# Patient Record
Sex: FEMALE | Race: WHITE | Employment: STUDENT | URBAN - METROPOLITAN AREA
[De-identification: names, ages, dates, MRNs, and addresses within clinical notes are randomized per-mention and may not be internally consistent; named-entity substitution may affect disease eponyms.]

---
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
## 2023-02-27 ENCOUNTER — APPOINTMENT (OUTPATIENT)
Dept: URBAN - METROPOLITAN AREA SURGERY 11 | Age: 18
Setting detail: DERMATOLOGY
End: 2023-02-27

## 2023-03-27 ENCOUNTER — APPOINTMENT (OUTPATIENT)
Dept: URBAN - METROPOLITAN AREA SURGERY 12 | Age: 18
Setting detail: DERMATOLOGY
End: 2023-03-27

## 2023-03-27 DIAGNOSIS — B07.8 OTHER VIRAL WARTS: ICD-10-CM

## 2023-03-27 PROCEDURE — OTHER BENIGN DESTRUCTION: OTHER

## 2023-03-27 PROCEDURE — OTHER ADDITIONAL NOTES: OTHER

## 2023-03-27 PROCEDURE — OTHER COUNSELING: OTHER

## 2023-03-27 PROCEDURE — 17110 DESTRUCT B9 LESION 1-14: CPT

## 2023-03-27 ASSESSMENT — LOCATION DETAILED DESCRIPTION DERM: LOCATION DETAILED: RIGHT MID RADIAL DORSAL RING FINGER

## 2023-03-27 ASSESSMENT — LOCATION SIMPLE DESCRIPTION DERM: LOCATION SIMPLE: RIGHT RING FINGER

## 2023-03-27 ASSESSMENT — LOCATION ZONE DERM: LOCATION ZONE: FINGER

## 2023-03-27 NOTE — PROCEDURE: COUNSELING
Topical Salicylic Acid Recommendations: Cont topical otc preparation qhs and RTO to repeat cryo to remaining lesions within 3-4 weeks.
Detail Level: Detailed

## 2023-03-27 NOTE — PROCEDURE: ADDITIONAL NOTES
Detail Level: Simple
Render Risk Assessment In Note?: no
Additional Notes: Spot Size: 7mm\\nFluence: 12 j/cm\\nNumber of Pulses: 2\\nPulse width: 1.5ms

## 2023-03-27 NOTE — PROCEDURE: BENIGN DESTRUCTION
Medical Necessity Information: It is in your best interest to select a reason for this procedure from the list below. All of these items fulfill various CMS LCD requirements except the new and changing color options.
Treatment Number (Will Not Render If 0): 0
Post-Care Instructions: I reviewed with the patient in detail post-care instructions. Patient is to wear sunprotection, and avoid picking at any of the treated lesions. Pt may apply Vaseline to crusted or scabbing areas.
Render Post-Care Instructions In Note?: no
Detail Level: Detailed
Medical Necessity Clause: This procedure was medically necessary because the lesions that were treated were:
Consent: The patient's consent was obtained including but not limited to risks of crusting, scabbing, blistering, scarring, darker or lighter pigmentary change, recurrence, incomplete removal and infection.

## 2024-06-20 ENCOUNTER — OFFICE (OUTPATIENT)
Dept: URBAN - METROPOLITAN AREA CLINIC 72 | Facility: CLINIC | Age: 19
End: 2024-06-20

## 2024-06-20 VITALS
RESPIRATION RATE: 14 BRPM | SYSTOLIC BLOOD PRESSURE: 116 MMHG | DIASTOLIC BLOOD PRESSURE: 64 MMHG | HEIGHT: 67 IN | HEART RATE: 108 BPM | WEIGHT: 165 LBS

## 2024-06-20 DIAGNOSIS — R74.8 ABNORMAL LEVELS OF OTHER SERUM ENZYMES: ICD-10-CM

## 2024-06-20 DIAGNOSIS — R10.10 UPPER ABDOMINAL PAIN, UNSPECIFIED: ICD-10-CM

## 2024-06-20 DIAGNOSIS — R10.13 EPIGASTRIC PAIN: ICD-10-CM

## 2024-06-20 DIAGNOSIS — G90.A POSTURAL ORTHOSTATIC TACHYCARDIA SYNDROME [POTS]: ICD-10-CM

## 2024-06-20 DIAGNOSIS — R11.2 NAUSEA WITH VOMITING, UNSPECIFIED: ICD-10-CM

## 2024-06-20 DIAGNOSIS — R13.10 DYSPHAGIA, UNSPECIFIED: ICD-10-CM

## 2024-06-20 DIAGNOSIS — R19.4 CHANGE IN BOWEL HABIT: ICD-10-CM

## 2024-06-20 DIAGNOSIS — R12 HEARTBURN: ICD-10-CM

## 2024-06-20 PROCEDURE — 99204 OFFICE O/P NEW MOD 45 MIN: CPT | Performed by: NURSE PRACTITIONER

## 2024-06-20 RX ORDER — PANTOPRAZOLE SODIUM 40 MG/1
40 TABLET, DELAYED RELEASE ORAL
Qty: 90 | Refills: 0 | Status: ACTIVE
Start: 2024-06-20

## 2024-06-24 LAB
ALLERGEN PROFILE, FOOD-BASIC: CLASS DESCRIPTION: (no result)
ALLERGEN PROFILE, FOOD-BASIC: F001-IGE EGG WHITE: <0.1 KU/L
ALLERGEN PROFILE, FOOD-BASIC: F002-IGE MILK: <0.1 KU/L
ALLERGEN PROFILE, FOOD-BASIC: F003-IGE CODFISH: <0.1 KU/L
ALLERGEN PROFILE, FOOD-BASIC: F004-IGE WHEAT: <0.1 KU/L
ALLERGEN PROFILE, FOOD-BASIC: F013-IGE PEANUT: <0.1 KU/L
ALLERGEN PROFILE, FOOD-BASIC: F014-IGE SOYBEAN: <0.1 KU/L
CBC WITH DIFFERENTIAL/PLATELET: BASO (ABSOLUTE): 0 X10E3/UL (ref 0–0.2)
CBC WITH DIFFERENTIAL/PLATELET: BASOS: 0 %
CBC WITH DIFFERENTIAL/PLATELET: EOS (ABSOLUTE): 0.2 X10E3/UL (ref 0–0.4)
CBC WITH DIFFERENTIAL/PLATELET: EOS: 2 %
CBC WITH DIFFERENTIAL/PLATELET: HEMATOCRIT: 42.2 % (ref 34–46.6)
CBC WITH DIFFERENTIAL/PLATELET: HEMATOLOGY COMMENTS: (no result)
CBC WITH DIFFERENTIAL/PLATELET: HEMOGLOBIN: 13.9 G/DL (ref 11.1–15.9)
CBC WITH DIFFERENTIAL/PLATELET: IMMATURE CELLS: (no result)
CBC WITH DIFFERENTIAL/PLATELET: IMMATURE GRANS (ABS): 0 X10E3/UL (ref 0–0.1)
CBC WITH DIFFERENTIAL/PLATELET: IMMATURE GRANULOCYTES: 1 %
CBC WITH DIFFERENTIAL/PLATELET: LYMPHS (ABSOLUTE): 2 X10E3/UL (ref 0.7–3.1)
CBC WITH DIFFERENTIAL/PLATELET: LYMPHS: 25 %
CBC WITH DIFFERENTIAL/PLATELET: MCH: 29.6 PG (ref 26.6–33)
CBC WITH DIFFERENTIAL/PLATELET: MCHC: 32.9 G/DL (ref 31.5–35.7)
CBC WITH DIFFERENTIAL/PLATELET: MCV: 90 FL (ref 79–97)
CBC WITH DIFFERENTIAL/PLATELET: MONOCYTES(ABSOLUTE): 0.5 X10E3/UL (ref 0.1–0.9)
CBC WITH DIFFERENTIAL/PLATELET: MONOCYTES: 6 %
CBC WITH DIFFERENTIAL/PLATELET: NEUTROPHILS (ABSOLUTE): 5.3 X10E3/UL (ref 1.4–7)
CBC WITH DIFFERENTIAL/PLATELET: NEUTROPHILS: 66 %
CBC WITH DIFFERENTIAL/PLATELET: NRBC: (no result)
CBC WITH DIFFERENTIAL/PLATELET: PLATELETS: 286 X10E3/UL (ref 150–450)
CBC WITH DIFFERENTIAL/PLATELET: RBC: 4.7 X10E6/UL (ref 3.77–5.28)
CBC WITH DIFFERENTIAL/PLATELET: RDW: 12.7 % (ref 11.7–15.4)
CBC WITH DIFFERENTIAL/PLATELET: WBC: 8.1 X10E3/UL (ref 3.4–10.8)
COMP. METABOLIC PANEL (14): ALBUMIN: 4.5 G/DL (ref 4–5)
COMP. METABOLIC PANEL (14): ALKALINE PHOSPHATASE: 94 IU/L (ref 42–106)
COMP. METABOLIC PANEL (14): ALT (SGPT): 62 IU/L — HIGH (ref 0–32)
COMP. METABOLIC PANEL (14): AST (SGOT): 55 IU/L — HIGH (ref 0–40)
COMP. METABOLIC PANEL (14): BILIRUBIN, TOTAL: 0.5 MG/DL (ref 0–1.2)
COMP. METABOLIC PANEL (14): BUN/CREATININE RATIO: 13 (ref 9–23)
COMP. METABOLIC PANEL (14): BUN: 9 MG/DL (ref 6–20)
COMP. METABOLIC PANEL (14): CALCIUM: 9.8 MG/DL (ref 8.7–10.2)
COMP. METABOLIC PANEL (14): CARBON DIOXIDE, TOTAL: 20 MMOL/L (ref 20–29)
COMP. METABOLIC PANEL (14): CHLORIDE: 104 MMOL/L (ref 96–106)
COMP. METABOLIC PANEL (14): CREATININE: 0.67 MG/DL (ref 0.57–1)
COMP. METABOLIC PANEL (14): EGFR: 128 ML/MIN/1.73 (ref 59–?)
COMP. METABOLIC PANEL (14): GLOBULIN, TOTAL: 2.6 G/DL (ref 1.5–4.5)
COMP. METABOLIC PANEL (14): GLUCOSE: 107 MG/DL — HIGH (ref 70–99)
COMP. METABOLIC PANEL (14): POTASSIUM: 4.2 MMOL/L (ref 3.5–5.2)
COMP. METABOLIC PANEL (14): PROTEIN, TOTAL: 7.1 G/DL (ref 6–8.5)
COMP. METABOLIC PANEL (14): SODIUM: 139 MMOL/L (ref 134–144)
T-TRANSGLUTAMINASE (TTG) IGA: <2 U/ML

## 2024-06-25 ENCOUNTER — OFFICE (OUTPATIENT)
Dept: URBAN - METROPOLITAN AREA PATHOLOGY 10 | Facility: PATHOLOGY | Age: 19
End: 2024-06-25
Payer: COMMERCIAL

## 2024-06-25 ENCOUNTER — AMBULATORY SURGICAL CENTER (OUTPATIENT)
Dept: URBAN - METROPOLITAN AREA SURGERY 19 | Facility: SURGERY | Age: 19
End: 2024-06-25

## 2024-06-25 DIAGNOSIS — K29.50 UNSPECIFIED CHRONIC GASTRITIS WITHOUT BLEEDING: ICD-10-CM

## 2024-06-25 DIAGNOSIS — R13.14 DYSPHAGIA, PHARYNGOESOPHAGEAL PHASE: ICD-10-CM

## 2024-06-25 LAB
RELEVANT H&P ENDOSCOPY: (no result)
RELEVANT H&P ENDOSCOPY: (no result)

## 2024-06-25 PROCEDURE — 88305 TISSUE EXAM BY PATHOLOGIST: CPT | Performed by: STUDENT IN AN ORGANIZED HEALTH CARE EDUCATION/TRAINING PROGRAM

## 2024-06-25 PROCEDURE — 43239 EGD BIOPSY SINGLE/MULTIPLE: CPT | Performed by: INTERNAL MEDICINE

## 2024-06-25 PROCEDURE — 88313 SPECIAL STAINS GROUP 2: CPT | Performed by: STUDENT IN AN ORGANIZED HEALTH CARE EDUCATION/TRAINING PROGRAM

## 2024-06-25 PROCEDURE — 88342 IMHCHEM/IMCYTCHM 1ST ANTB: CPT | Performed by: STUDENT IN AN ORGANIZED HEALTH CARE EDUCATION/TRAINING PROGRAM

## 2024-07-18 ENCOUNTER — OFFICE (OUTPATIENT)
Dept: URBAN - METROPOLITAN AREA CLINIC 72 | Facility: CLINIC | Age: 19
End: 2024-07-18

## 2024-07-18 VITALS
DIASTOLIC BLOOD PRESSURE: 84 MMHG | SYSTOLIC BLOOD PRESSURE: 122 MMHG | HEART RATE: 84 BPM | HEIGHT: 67 IN | WEIGHT: 170 LBS

## 2024-07-18 DIAGNOSIS — K76.0 FATTY (CHANGE OF) LIVER, NOT ELSEWHERE CLASSIFIED: ICD-10-CM

## 2024-07-18 DIAGNOSIS — R15.2 FECAL URGENCY: ICD-10-CM

## 2024-07-18 DIAGNOSIS — R19.7 DIARRHEA, UNSPECIFIED: ICD-10-CM

## 2024-07-18 DIAGNOSIS — R12 HEARTBURN: ICD-10-CM

## 2024-07-18 DIAGNOSIS — E74.31 SUCRASE-ISOMALTASE DEFICIENCY: ICD-10-CM

## 2024-07-18 DIAGNOSIS — R74.8 ABNORMAL LEVELS OF OTHER SERUM ENZYMES: ICD-10-CM

## 2024-07-18 PROCEDURE — 99214 OFFICE O/P EST MOD 30 MIN: CPT | Performed by: NURSE PRACTITIONER

## 2024-07-18 RX ORDER — DICYCLOMINE HYDROCHLORIDE 10 MG/1
30 CAPSULE ORAL
Qty: 90 | Refills: 0 | Status: ACTIVE
Start: 2024-07-18

## 2025-02-07 ENCOUNTER — APPOINTMENT (OUTPATIENT)
Dept: URBAN - METROPOLITAN AREA SURGERY 12 | Age: 20
Setting detail: DERMATOLOGY
End: 2025-02-08

## 2025-02-07 DIAGNOSIS — B07.8 OTHER VIRAL WARTS: ICD-10-CM

## 2025-02-07 DIAGNOSIS — L30.9 DERMATITIS, UNSPECIFIED: ICD-10-CM

## 2025-02-07 PROCEDURE — OTHER COUNSELING: OTHER

## 2025-02-07 PROCEDURE — 99213 OFFICE O/P EST LOW 20 MIN: CPT | Mod: 25

## 2025-02-07 PROCEDURE — OTHER ADDITIONAL NOTES: OTHER

## 2025-02-07 PROCEDURE — 17110 DESTRUCT B9 LESION 1-14: CPT

## 2025-02-07 PROCEDURE — OTHER PRESCRIPTION: OTHER

## 2025-02-07 PROCEDURE — OTHER LIQUID NITROGEN: OTHER

## 2025-02-07 RX ORDER — TACROLIMUS 1 MG/G
OINTMENT TOPICAL
Qty: 60 | Refills: 2 | Status: ERX | COMMUNITY
Start: 2025-02-07

## 2025-02-07 ASSESSMENT — LOCATION ZONE DERM
LOCATION ZONE: FINGER
LOCATION ZONE: NOSE
LOCATION ZONE: LIP

## 2025-02-07 ASSESSMENT — LOCATION DETAILED DESCRIPTION DERM
LOCATION DETAILED: RIGHT NARIS
LOCATION DETAILED: RIGHT DISTAL RADIAL DORSAL RING FINGER
LOCATION DETAILED: LEFT UPPER CUTANEOUS LIP
LOCATION DETAILED: LEFT NARIS

## 2025-02-07 ASSESSMENT — LOCATION SIMPLE DESCRIPTION DERM
LOCATION SIMPLE: LEFT NOSE
LOCATION SIMPLE: LEFT LIP
LOCATION SIMPLE: RIGHT NOSE
LOCATION SIMPLE: RIGHT RING FINGER

## 2025-02-07 NOTE — HPI: OTHER
Condition:: Pt present for irritation on nose and wart.
Please Describe Your Condition:: Pt present for irritation on nose since being sick before [place] and hadn’t went away with moisturizer. Pt has warts that have been txtd in past, but are back on hands.

## 2025-02-07 NOTE — PROCEDURE: LIQUID NITROGEN
Render Note In Bullet Format When Appropriate: No
Show Aperture Variable?: Yes
Medical Necessity Clause: This procedure was medically necessary because the lesions that were treated were:
Post-Care Instructions: I reviewed with the patient in detail post-care instructions. Patient is to wear sunprotection, and avoid picking at any of the treated lesions. Pt may apply Vaseline to crusted or scabbing areas.
Detail Level: Detailed
Medical Necessity Information: It is in your best interest to select a reason for this procedure from the list below. All of these items fulfill various CMS LCD requirements except the new and changing color options.
Consent: The patient's consent was obtained including but not limited to risks of crusting, scabbing, blistering, scarring, darker or lighter pigmentary change, recurrence, incomplete removal and infection.
Spray Paint Text: The liquid nitrogen was applied to the skin utilizing a spray paint frosting technique.
Number Of Freeze-Thaw Cycles: 2 freeze-thaw cycles

## 2025-02-07 NOTE — PROCEDURE: ADDITIONAL NOTES
Render Risk Assessment In Note?: no
Additional Notes: May use cicaplast or aquaphor prn\\n\\nShe usually uses Flonase but hasn’t used lately, caution with this around this region. She had been blowing nose frequently  before. Not noted around mouth
Detail Level: Simple

## (undated) NOTE — MR AVS SNAPSHOT
[address] [facility]  [phone]               Thank you for choosing us for your health care visit with COCO Stephens.   We are glad to serve you and happy to provide you with this summa STREP A ASSAY W/OPTIC      Component Value Standard Range & Units    STREP GRP A CUL-SCR Negative Negative    Control Line Present with a clear background (yes/no) Yes Yes/No    Kit Lot # [id number] Numeric    Kit Expiration Date 9/8/18 Date o playing a game of tag  o cooking healthy meals together  o creating a rainbow shopping list to find colorful fruits and vegetables  o go on a walking scavenger hunt through the neighborhood   o grow a family garden    In addition to 5, 4, 3, 2, 1 familie

## (undated) NOTE — LETTER
[facility] [place] [address]  [address] [address]  Dept: [phone]  Dept Fax: [phone] 49: [phone]      March 18, 2017    Patient: Nancy Hare   Date of Visit: 3/18/2017       To Whom It May Concern:    Leonardo Lloyd

## (undated) NOTE — Clinical Note
3/9/2017              Jaspreet Watson        [address] [facility] [address]         To whom it may concern,    Please excuse Carmela Sandra from school today due to being ill. She may return back to school tomorrow.  If you have any questions or conc

## (undated) NOTE — MR AVS SNAPSHOT
[facility]  [facility], [address]  [phone]               Thank you for choosing us for your health care visit with Jason Salcedo DO.   We are glad to serve you and happy to provide you with this summ infants 6 months or older, you may use ibuprofen (Children’s Motrin) instead to help reduce the fever. (Never give aspirin to a child under age 25.  It could cause a rare but serious condition called Reye’s syndrome.)  When to seek medical care  Most childr 18-23 lbs               3.75 ml  24-35 lbs               5 ml                          2                              1  36-47 lbs               7.5 ml                       3                              1&1/2  48-59 lbs               10 ml 3               1&1/2 tablets  96 lbs and over                                           4 tsp                              4               2 tablets           Allergies as of Mar 09, 2017     No Known Allergies                   Curr

## (undated) NOTE — Clinical Note
2/10/2017              [facility]         To Whom It May Concern,    Please be advised Adelita Carmen was seen in my office on 2/6 and diagnosed with a viral illness.  Please excuse her from school on 2/7/17

## (undated) NOTE — ED AVS SNAPSHOT
[facility] AND [facility] in [address] [facility] [address] [address]    Phone:  [phone]    Fax:  [phone]           Ms. Deannie Halsted   MRN: [id number]    Department:  [facility] AND [facility] in [address] [place] [address]   Date of Visit: physician may seek payment directly from you for amounts other than your deductible, co-payment, or co-insurance and for other services not covered under your health insurance plan.   Please contact your insurance company and physician's office to determine different from what your Primary Care doctor has instructed you - please continue to take your medications as instructed by your Primary Care doctor until you can check with your doctor. Please bring the medication list to your next doctor's appointment. coverage. Patient [address] is a Federal Navigator program that can help with your Affordable Care Act coverage, as well as all types of Medicaid plans.   To get signed up and covered, please call [phone] and ask to get set up for an insuran